# Patient Record
Sex: MALE | Race: WHITE
[De-identification: names, ages, dates, MRNs, and addresses within clinical notes are randomized per-mention and may not be internally consistent; named-entity substitution may affect disease eponyms.]

---

## 2020-04-27 ENCOUNTER — HOSPITAL ENCOUNTER (OUTPATIENT)
Dept: HOSPITAL 95 - LAB SHORT | Age: 60
Discharge: HOME | End: 2020-04-27
Attending: DERMATOLOGY
Payer: MEDICARE

## 2020-04-27 DIAGNOSIS — D48.5: Primary | ICD-10-CM

## 2021-11-30 ENCOUNTER — HOSPITAL ENCOUNTER (OUTPATIENT)
Dept: HOSPITAL 95 - ORSCSDS | Age: 61
Discharge: HOME | End: 2021-11-30
Attending: INTERNAL MEDICINE
Payer: MEDICARE

## 2021-11-30 VITALS — WEIGHT: 227.96 LBS | HEIGHT: 72 IN | BODY MASS INDEX: 30.88 KG/M2

## 2021-11-30 DIAGNOSIS — Z80.0: ICD-10-CM

## 2021-11-30 DIAGNOSIS — Z12.11: Primary | ICD-10-CM

## 2021-11-30 DIAGNOSIS — Z79.899: ICD-10-CM

## 2021-11-30 DIAGNOSIS — Z86.010: ICD-10-CM

## 2021-11-30 PROCEDURE — 0DJD8ZZ INSPECTION OF LOWER INTESTINAL TRACT, VIA NATURAL OR ARTIFICIAL OPENING ENDOSCOPIC: ICD-10-PCS | Performed by: INTERNAL MEDICINE

## 2022-10-20 ENCOUNTER — HOSPITAL ENCOUNTER (OUTPATIENT)
Dept: HOSPITAL 95 - ORSCSDS | Age: 62
Discharge: HOME | End: 2022-10-20
Attending: INTERNAL MEDICINE
Payer: MEDICARE

## 2022-10-20 VITALS — WEIGHT: 215.83 LBS | HEIGHT: 72 IN | BODY MASS INDEX: 29.23 KG/M2

## 2022-10-20 DIAGNOSIS — K51.40: ICD-10-CM

## 2022-10-20 DIAGNOSIS — F31.9: ICD-10-CM

## 2022-10-20 DIAGNOSIS — Z12.11: ICD-10-CM

## 2022-10-20 DIAGNOSIS — N18.4: ICD-10-CM

## 2022-10-20 DIAGNOSIS — Z86.010: Primary | ICD-10-CM

## 2022-10-20 DIAGNOSIS — D12.3: ICD-10-CM

## 2022-10-20 DIAGNOSIS — Z79.899: ICD-10-CM

## 2022-10-20 DIAGNOSIS — Z80.0: ICD-10-CM

## 2022-10-20 DIAGNOSIS — Z85.528: ICD-10-CM

## 2022-10-20 DIAGNOSIS — K64.8: ICD-10-CM

## 2022-10-20 DIAGNOSIS — E78.5: ICD-10-CM

## 2022-10-20 DIAGNOSIS — Z87.891: ICD-10-CM

## 2022-10-20 DIAGNOSIS — Z99.2: ICD-10-CM

## 2022-10-20 DIAGNOSIS — I12.9: ICD-10-CM

## 2022-10-20 PROCEDURE — 0DBM8ZX EXCISION OF DESCENDING COLON, VIA NATURAL OR ARTIFICIAL OPENING ENDOSCOPIC, DIAGNOSTIC: ICD-10-PCS | Performed by: INTERNAL MEDICINE

## 2022-10-20 PROCEDURE — 0DBL8ZX EXCISION OF TRANSVERSE COLON, VIA NATURAL OR ARTIFICIAL OPENING ENDOSCOPIC, DIAGNOSTIC: ICD-10-PCS | Performed by: INTERNAL MEDICINE

## 2024-11-19 ENCOUNTER — HOSPITAL ENCOUNTER (OUTPATIENT)
Dept: HOSPITAL 95 - ORSCSDS | Age: 64
Discharge: HOME | End: 2024-11-19
Attending: INTERNAL MEDICINE
Payer: COMMERCIAL

## 2024-11-19 VITALS — DIASTOLIC BLOOD PRESSURE: 73 MMHG | SYSTOLIC BLOOD PRESSURE: 151 MMHG

## 2024-11-19 VITALS — WEIGHT: 217.38 LBS | BODY MASS INDEX: 29.44 KG/M2 | HEIGHT: 72 IN

## 2024-11-19 DIAGNOSIS — N18.5: ICD-10-CM

## 2024-11-19 DIAGNOSIS — K59.00: ICD-10-CM

## 2024-11-19 DIAGNOSIS — K22.70: ICD-10-CM

## 2024-11-19 DIAGNOSIS — E78.5: ICD-10-CM

## 2024-11-19 DIAGNOSIS — R10.13: Primary | ICD-10-CM

## 2024-11-19 DIAGNOSIS — Z79.899: ICD-10-CM

## 2024-11-19 DIAGNOSIS — Z87.891: ICD-10-CM

## 2024-11-19 DIAGNOSIS — R63.4: ICD-10-CM

## 2024-11-19 DIAGNOSIS — I12.0: ICD-10-CM

## 2024-11-19 PROCEDURE — 0DB58ZX EXCISION OF ESOPHAGUS, VIA NATURAL OR ARTIFICIAL OPENING ENDOSCOPIC, DIAGNOSTIC: ICD-10-PCS | Performed by: INTERNAL MEDICINE

## 2025-02-21 ENCOUNTER — HOSPITAL ENCOUNTER (OUTPATIENT)
Dept: HOSPITAL 95 - ORSCSDS | Age: 65
Discharge: HOME | End: 2025-02-21
Attending: INTERNAL MEDICINE
Payer: COMMERCIAL

## 2025-02-21 VITALS — WEIGHT: 207.23 LBS | BODY MASS INDEX: 28.07 KG/M2 | HEIGHT: 72 IN

## 2025-02-21 VITALS — DIASTOLIC BLOOD PRESSURE: 61 MMHG | SYSTOLIC BLOOD PRESSURE: 108 MMHG

## 2025-02-21 DIAGNOSIS — Z85.528: ICD-10-CM

## 2025-02-21 DIAGNOSIS — R63.4: Primary | ICD-10-CM

## 2025-02-21 DIAGNOSIS — Z85.46: ICD-10-CM

## 2025-02-21 DIAGNOSIS — D12.0: ICD-10-CM

## 2025-02-21 DIAGNOSIS — Z86.0101: ICD-10-CM

## 2025-02-21 DIAGNOSIS — I12.0: ICD-10-CM

## 2025-02-21 DIAGNOSIS — Z80.0: ICD-10-CM

## 2025-02-21 DIAGNOSIS — Z87.891: ICD-10-CM

## 2025-02-21 DIAGNOSIS — N18.5: ICD-10-CM

## 2025-02-21 DIAGNOSIS — Z79.899: ICD-10-CM

## 2025-02-21 PROCEDURE — 0DBH8ZX EXCISION OF CECUM, VIA NATURAL OR ARTIFICIAL OPENING ENDOSCOPIC, DIAGNOSTIC: ICD-10-PCS | Performed by: INTERNAL MEDICINE

## 2025-02-21 NOTE — NUR
02/21/25 Aneta Valdez
PT REPORTED SLIGHT DIZZINESS FROM VERTIGO AND HE STATES "PROBABLY FROM
THE MEDICATION", VITALS WITHIN NORMAL RANGE, NO ABNORMAL SYMPTOMS
REPORTED BY PATIENT, PATIENT EXPRESSES READINESS TO LEAVE.

## 2025-03-16 ENCOUNTER — HOSPITAL ENCOUNTER (INPATIENT)
Dept: HOSPITAL 95 - ER | Age: 65
LOS: 9 days | Discharge: NURSING, REHAB OR LONG-TERM CARE | DRG: 480 | End: 2025-03-25
Attending: INTERNAL MEDICINE | Admitting: STUDENT IN AN ORGANIZED HEALTH CARE EDUCATION/TRAINING PROGRAM
Payer: COMMERCIAL

## 2025-03-16 VITALS — SYSTOLIC BLOOD PRESSURE: 160 MMHG | DIASTOLIC BLOOD PRESSURE: 81 MMHG

## 2025-03-16 VITALS — HEIGHT: 72 IN | BODY MASS INDEX: 27.95 KG/M2 | WEIGHT: 206.35 LBS

## 2025-03-16 DIAGNOSIS — T68.XXXA: ICD-10-CM

## 2025-03-16 DIAGNOSIS — Z90.49: ICD-10-CM

## 2025-03-16 DIAGNOSIS — Z99.2: ICD-10-CM

## 2025-03-16 DIAGNOSIS — D63.1: ICD-10-CM

## 2025-03-16 DIAGNOSIS — E87.6: ICD-10-CM

## 2025-03-16 DIAGNOSIS — N18.6: ICD-10-CM

## 2025-03-16 DIAGNOSIS — D50.9: ICD-10-CM

## 2025-03-16 DIAGNOSIS — K59.00: ICD-10-CM

## 2025-03-16 DIAGNOSIS — R50.9: ICD-10-CM

## 2025-03-16 DIAGNOSIS — F31.9: ICD-10-CM

## 2025-03-16 DIAGNOSIS — E78.5: ICD-10-CM

## 2025-03-16 DIAGNOSIS — M51.369: ICD-10-CM

## 2025-03-16 DIAGNOSIS — S72.492A: Primary | ICD-10-CM

## 2025-03-16 DIAGNOSIS — Z85.828: ICD-10-CM

## 2025-03-16 DIAGNOSIS — M25.462: ICD-10-CM

## 2025-03-16 DIAGNOSIS — M19.90: ICD-10-CM

## 2025-03-16 DIAGNOSIS — Z79.899: ICD-10-CM

## 2025-03-16 DIAGNOSIS — N40.0: ICD-10-CM

## 2025-03-16 DIAGNOSIS — G89.29: ICD-10-CM

## 2025-03-16 DIAGNOSIS — Z88.8: ICD-10-CM

## 2025-03-16 DIAGNOSIS — N25.81: ICD-10-CM

## 2025-03-16 DIAGNOSIS — G93.40: ICD-10-CM
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
## 2025-03-16 DIAGNOSIS — Z90.5: ICD-10-CM

## 2025-03-16 DIAGNOSIS — I12.0: ICD-10-CM

## 2025-03-16 LAB
ALBUMIN SERPL BCP-MCNC: 3.2 G/DL (ref 3.4–5)
ALBUMIN/GLOB SERPL: 1 {RATIO} (ref 0.8–1.8)
ALT SERPL W P-5'-P-CCNC: 37 U/L (ref 12–78)
ANION GAP SERPL CALCULATED.4IONS-SCNC: 12 MMOL/L (ref 3–11)
AST SERPL W P-5'-P-CCNC: 24 U/L (ref 12–37)
BASOPHILS # BLD AUTO: 0 K/MM3 (ref 0–0.23)
BASOPHILS NFR BLD AUTO: 0 % (ref 0–2)
BILIRUB SERPL-MCNC: 0.4 MG/DL (ref 0.1–1)
BUN SERPL-MCNC: 26 MG/DL (ref 8–24)
CALCIUM SERPL-MCNC: 8.3 MG/DL (ref 8.5–10.1)
CHLORIDE SERPL-SCNC: 112 MMOL/L (ref 98–108)
CO2 SERPL-SCNC: 21 MMOL/L (ref 21–32)
CREAT SERPL-MCNC: 3.39 MG/DL (ref 0.6–1.2)
DEPRECATED RDW RBC AUTO: 46.4 FL (ref 35.1–46.3)
EOSINOPHIL # BLD AUTO: 0.37 K/MM3 (ref 0–0.68)
EOSINOPHIL NFR BLD AUTO: 5 % (ref 0–6)
ERYTHROCYTE [DISTWIDTH] IN BLOOD BY AUTOMATED COUNT: 13.4 % (ref 11.7–14.2)
GLOBULIN SER CALC-MCNC: 3.3 G/DL (ref 2.2–4)
GLUCOSE SERPL-MCNC: 100 MG/DL (ref 70–99)
HCT VFR BLD AUTO: 36.8 % (ref 37–53)
HGB BLD-MCNC: 12.2 G/DL (ref 13.5–17.5)
IMM GRANULOCYTES # BLD AUTO: 0.02 K/MM3 (ref 0–0.1)
IMM GRANULOCYTES NFR BLD AUTO: 0 % (ref 0–1)
LYMPHOCYTES # BLD AUTO: 1.84 K/MM3 (ref 0.84–5.2)
LYMPHOCYTES NFR BLD AUTO: 27 % (ref 21–46)
MCHC RBC AUTO-ENTMCNC: 33.2 G/DL (ref 31.5–36.5)
MCV RBC AUTO: 96 FL (ref 80–100)
MONOCYTES # BLD AUTO: 0.55 K/MM3 (ref 0.16–1.47)
MONOCYTES NFR BLD AUTO: 8 % (ref 4–13)
NEUTROPHILS # BLD AUTO: 4.03 K/MM3 (ref 1.96–9.15)
NEUTROPHILS NFR BLD AUTO: 59 % (ref 41–73)
NRBC # BLD AUTO: 0 K/MM3 (ref 0–0.02)
NRBC BLD AUTO-RTO: 0 /100 WBC (ref 0–0.2)
PLATELET # BLD AUTO: 162 K/MM3 (ref 150–400)
POTASSIUM SERPL-SCNC: 4.3 MMOL/L (ref 3.5–5.5)
PROT SERPL-MCNC: 6.5 G/DL (ref 6.4–8.2)
PROTHROMBIN TIME: 10.8 SEC (ref 9.7–11.5)
SODIUM SERPL-SCNC: 141 MMOL/L (ref 136–145)

## 2025-03-16 PROCEDURE — A9270 NON-COVERED ITEM OR SERVICE: HCPCS

## 2025-03-16 PROCEDURE — C1894 INTRO/SHEATH, NON-LASER: HCPCS

## 2025-03-16 PROCEDURE — C1713 ANCHOR/SCREW BN/BN,TIS/BN: HCPCS

## 2025-03-16 PROCEDURE — C1769 GUIDE WIRE: HCPCS

## 2025-03-16 PROCEDURE — C1750 CATH, HEMODIALYSIS,LONG-TERM: HCPCS

## 2025-03-17 VITALS — SYSTOLIC BLOOD PRESSURE: 168 MMHG | DIASTOLIC BLOOD PRESSURE: 80 MMHG

## 2025-03-17 VITALS — SYSTOLIC BLOOD PRESSURE: 111 MMHG | DIASTOLIC BLOOD PRESSURE: 58 MMHG

## 2025-03-17 VITALS — SYSTOLIC BLOOD PRESSURE: 128 MMHG | DIASTOLIC BLOOD PRESSURE: 63 MMHG

## 2025-03-17 VITALS — DIASTOLIC BLOOD PRESSURE: 62 MMHG | SYSTOLIC BLOOD PRESSURE: 114 MMHG

## 2025-03-17 VITALS — SYSTOLIC BLOOD PRESSURE: 164 MMHG | DIASTOLIC BLOOD PRESSURE: 83 MMHG

## 2025-03-17 VITALS — SYSTOLIC BLOOD PRESSURE: 164 MMHG | DIASTOLIC BLOOD PRESSURE: 77 MMHG

## 2025-03-17 VITALS — SYSTOLIC BLOOD PRESSURE: 111 MMHG | DIASTOLIC BLOOD PRESSURE: 51 MMHG

## 2025-03-17 VITALS — DIASTOLIC BLOOD PRESSURE: 49 MMHG | SYSTOLIC BLOOD PRESSURE: 111 MMHG

## 2025-03-17 VITALS — SYSTOLIC BLOOD PRESSURE: 173 MMHG | DIASTOLIC BLOOD PRESSURE: 82 MMHG

## 2025-03-17 VITALS — DIASTOLIC BLOOD PRESSURE: 73 MMHG | SYSTOLIC BLOOD PRESSURE: 144 MMHG

## 2025-03-17 VITALS — DIASTOLIC BLOOD PRESSURE: 76 MMHG | SYSTOLIC BLOOD PRESSURE: 140 MMHG

## 2025-03-17 VITALS — SYSTOLIC BLOOD PRESSURE: 148 MMHG | DIASTOLIC BLOOD PRESSURE: 73 MMHG

## 2025-03-17 VITALS — DIASTOLIC BLOOD PRESSURE: 63 MMHG | SYSTOLIC BLOOD PRESSURE: 127 MMHG

## 2025-03-17 VITALS — SYSTOLIC BLOOD PRESSURE: 150 MMHG | DIASTOLIC BLOOD PRESSURE: 69 MMHG

## 2025-03-17 VITALS — SYSTOLIC BLOOD PRESSURE: 108 MMHG | DIASTOLIC BLOOD PRESSURE: 49 MMHG

## 2025-03-17 VITALS — DIASTOLIC BLOOD PRESSURE: 86 MMHG | SYSTOLIC BLOOD PRESSURE: 173 MMHG

## 2025-03-17 VITALS — SYSTOLIC BLOOD PRESSURE: 115 MMHG | DIASTOLIC BLOOD PRESSURE: 63 MMHG

## 2025-03-17 VITALS — DIASTOLIC BLOOD PRESSURE: 66 MMHG | SYSTOLIC BLOOD PRESSURE: 113 MMHG

## 2025-03-17 VITALS — SYSTOLIC BLOOD PRESSURE: 135 MMHG | DIASTOLIC BLOOD PRESSURE: 83 MMHG

## 2025-03-17 VITALS — SYSTOLIC BLOOD PRESSURE: 139 MMHG | DIASTOLIC BLOOD PRESSURE: 86 MMHG

## 2025-03-17 VITALS — SYSTOLIC BLOOD PRESSURE: 133 MMHG | DIASTOLIC BLOOD PRESSURE: 63 MMHG

## 2025-03-17 VITALS — DIASTOLIC BLOOD PRESSURE: 81 MMHG | SYSTOLIC BLOOD PRESSURE: 134 MMHG

## 2025-03-17 LAB
ANION GAP SERPL CALCULATED.4IONS-SCNC: 11 MMOL/L (ref 3–11)
BUN SERPL-MCNC: 29 MG/DL (ref 8–24)
CALCIUM SERPL-MCNC: 8.6 MG/DL (ref 8.5–10.1)
CHLORIDE SERPL-SCNC: 112 MMOL/L (ref 98–108)
CO2 SERPL-SCNC: 21 MMOL/L (ref 21–32)
CREAT SERPL-MCNC: 3.52 MG/DL (ref 0.6–1.2)
DEPRECATED RDW RBC AUTO: 46.6 FL (ref 35.1–46.3)
ERYTHROCYTE [DISTWIDTH] IN BLOOD BY AUTOMATED COUNT: 13.6 % (ref 11.7–14.2)
GLUCOSE SERPL-MCNC: 119 MG/DL (ref 70–99)
HCT VFR BLD AUTO: 31.2 % (ref 37–53)
HGB BLD-MCNC: 10.6 G/DL (ref 13.5–17.5)
MCHC RBC AUTO-ENTMCNC: 34 G/DL (ref 31.5–36.5)
MCV RBC AUTO: 95 FL (ref 80–100)
NRBC # BLD AUTO: 0 K/MM3 (ref 0–0.02)
NRBC BLD AUTO-RTO: 0 /100 WBC (ref 0–0.2)
PLATELET # BLD AUTO: 207 K/MM3 (ref 150–400)
POTASSIUM SERPL-SCNC: 4.5 MMOL/L (ref 3.5–5.5)
PROTHROMBIN TIME: 10.7 SEC (ref 9.7–11.5)
SODIUM SERPL-SCNC: 139 MMOL/L (ref 136–145)

## 2025-03-17 NOTE — NUR
TRANSFER TO UNIT
AFTER RECEIVING REPORT FROM PACU RN, PATIENT TRANSFERRED TO UNIT VIA BED AT
APPROX 1745. PATIENT LETHARGIC - EASILY AROUSABLE WITH VERBAL STIMULI BEFORE
FALLING BACK TO SLEEP. DENIES PAIN. S/P L ORIF. LLE IN ACEWRAP AND BRACE -
PPP. BRUISING NOTED TO TOP OF L FOOT. ON 2L VIA NC, SATs >90%. RR SHALLOW. SBP
150s. MAP >65. SCHEDULED PO HYDRALAZINE ORDERED FOR 2100. DENIES CHEST PAIN,
PRESSURE.  MALE PW IN PLACE TO SUCTION. IVF AND TXA INFUSING TO GRAVITY. CALL
LIGHT IN REACH.

## 2025-03-18 VITALS — SYSTOLIC BLOOD PRESSURE: 126 MMHG | DIASTOLIC BLOOD PRESSURE: 73 MMHG

## 2025-03-18 VITALS — SYSTOLIC BLOOD PRESSURE: 135 MMHG | DIASTOLIC BLOOD PRESSURE: 63 MMHG

## 2025-03-18 VITALS — SYSTOLIC BLOOD PRESSURE: 155 MMHG | DIASTOLIC BLOOD PRESSURE: 72 MMHG

## 2025-03-18 VITALS — DIASTOLIC BLOOD PRESSURE: 69 MMHG | SYSTOLIC BLOOD PRESSURE: 144 MMHG

## 2025-03-18 VITALS — DIASTOLIC BLOOD PRESSURE: 72 MMHG | SYSTOLIC BLOOD PRESSURE: 155 MMHG

## 2025-03-18 PROCEDURE — 0QSC04Z REPOSITION LEFT LOWER FEMUR WITH INTERNAL FIXATION DEVICE, OPEN APPROACH: ICD-10-PCS

## 2025-03-18 NOTE — NUR
SHIFT SUMMARY
POD 1 L FEMUR ORIF. NO ACUTE CHANGES OVERNIGHT. VSS. TOLERATING ORALS. VOIDING
USING MALE PUREWICK. HEMODIALYSIS THROUGHOUT THE NIGHT. LLE c ACE WRAP/BRACE
IN USE. PT REPORTS PAIN TOLERABLE, MEDICATED PER EMAR. MAINTAINED BEDREST
STATUS. PT SLEPT WELL. CALL LIGHT IN REACH, BED IN LOWEST POSITION, WILL
REPORT TO DAY RN.

## 2025-03-18 NOTE — NUR
DISCHARGE
 
PT REQUESTED TO DISCHARGE THIS EVENING. FARZANA BROWN RN WENT OVER
DISCHARGE INSTRUCTIONS WITH PT. MEDF REC COMPLETED, AND DISCHARGE PAPERWORK
SIGNED. IV REMOVED. WAITING TO BE WHEELED BY CNA AT THIS TIME.

## 2025-03-18 NOTE — NUR
end of shift
Pt presently up to chair with 2-3 max assist. Previously sat up on bsc to pass
flatus without successful bm. Wearing Knee brace L leg. LLE remains ace
wrapped. Presently resting quietly. denies complaints.

## 2025-03-19 VITALS — SYSTOLIC BLOOD PRESSURE: 127 MMHG | DIASTOLIC BLOOD PRESSURE: 62 MMHG

## 2025-03-19 VITALS — SYSTOLIC BLOOD PRESSURE: 137 MMHG | DIASTOLIC BLOOD PRESSURE: 64 MMHG

## 2025-03-19 VITALS — DIASTOLIC BLOOD PRESSURE: 73 MMHG | SYSTOLIC BLOOD PRESSURE: 147 MMHG

## 2025-03-19 VITALS — DIASTOLIC BLOOD PRESSURE: 65 MMHG | SYSTOLIC BLOOD PRESSURE: 144 MMHG

## 2025-03-19 LAB
ALBUMIN SERPL BCP-MCNC: 2.7 G/DL (ref 3.4–5)
ANION GAP SERPL CALCULATED.4IONS-SCNC: 9 MMOL/L (ref 3–11)
BASOPHILS # BLD AUTO: 0.01 K/MM3 (ref 0–0.23)
BASOPHILS NFR BLD AUTO: 0 % (ref 0–2)
BUN SERPL-MCNC: 40 MG/DL (ref 8–24)
CALCIUM SERPL-MCNC: 8.1 MG/DL (ref 8.5–10.1)
CHLORIDE SERPL-SCNC: 108 MMOL/L (ref 98–108)
CO2 SERPL-SCNC: 25 MMOL/L (ref 21–32)
CREAT SERPL-MCNC: 4.42 MG/DL (ref 0.6–1.2)
DEPRECATED RDW RBC AUTO: 46.5 FL (ref 35.1–46.3)
EOSINOPHIL # BLD AUTO: 0.05 K/MM3 (ref 0–0.68)
EOSINOPHIL NFR BLD AUTO: 1 % (ref 0–6)
ERYTHROCYTE [DISTWIDTH] IN BLOOD BY AUTOMATED COUNT: 13.5 % (ref 11.7–14.2)
GLUCOSE SERPL-MCNC: 106 MG/DL (ref 70–99)
HBV SURFACE AB SERPL IA-ACNC: 31.54 IU/L
HCT VFR BLD AUTO: 23.4 % (ref 37–53)
HGB BLD-MCNC: 7.9 G/DL (ref 13.5–17.5)
IMM GRANULOCYTES # BLD AUTO: 0.02 K/MM3 (ref 0–0.1)
IMM GRANULOCYTES NFR BLD AUTO: 0 % (ref 0–1)
LYMPHOCYTES # BLD AUTO: 2.44 K/MM3 (ref 0.84–5.2)
LYMPHOCYTES NFR BLD AUTO: 26 % (ref 21–46)
MCHC RBC AUTO-ENTMCNC: 33.8 G/DL (ref 31.5–36.5)
MCV RBC AUTO: 94 FL (ref 80–100)
MONOCYTES # BLD AUTO: 1 K/MM3 (ref 0.16–1.47)
MONOCYTES NFR BLD AUTO: 11 % (ref 4–13)
NEUTROPHILS # BLD AUTO: 5.83 K/MM3 (ref 1.96–9.15)
NEUTROPHILS NFR BLD AUTO: 62 % (ref 41–73)
NRBC # BLD AUTO: 0 K/MM3 (ref 0–0.02)
NRBC BLD AUTO-RTO: 0 /100 WBC (ref 0–0.2)
PHOSPHATE SERPL-MCNC: 3.8 MG/DL (ref 2.5–4.9)
PLATELET # BLD AUTO: 155 K/MM3 (ref 150–400)
POTASSIUM SERPL-SCNC: 3.9 MMOL/L (ref 3.5–5.5)
SODIUM SERPL-SCNC: 138 MMOL/L (ref 136–145)
TIBC SERPL-MCNC: 179 UG/DL (ref 250–450)

## 2025-03-19 NOTE — NUR
PT EYES CLOSED AND QUIET UPON ENTERING ROOM.  PT WAKENS EASILY TO FULL ORIENT.
OVERNIGHT PD THERAPY COMPLETE AS ORDERED.  PT AESEPTICALLY DISCONNECTED AND
CAPPED.  CYCLER STRIPPED AND CLEANED.

## 2025-03-19 NOTE — NUR
SHIFT SUMMARY
 
PT HAS RESTED T/O THE SHIFT. PT S/P LEFT ORIF, SURGICAL SITE WNL. PAIN BEING
MANAGED PER MAR. MALE PUREWICK IN PLACE, PT VOIDING. PERITONEAL DIALYSIS IN
PLACE T/O SHIFT, BEING MANAGED BY DIALYSIS RN. VITALS STABLE. PLAN OF CARE
UNCHANGED. BED IN LOWEST POSITION, CALL LIGHT WITHIN REACH.

## 2025-03-19 NOTE — NUR
PT AWAKE / ALERT
LAYING IN BED WATCHING TV.  PT VERBALIZED RELATIVE COMFORT AT THIS
TIME.
CYCLER STRUNG, PRIMED AND PROGRAMMED PER RX.  WHEN PRIME COMPLETE, PT
AESEPTICALLY CONNECTED.  PT MONITORED THROUGH FILL #1 AND NO DISCOMFORT
REPORTED.
EXIT SITE CARE DONE. NEW STERILE DRESSING APPLIED WITH ONE STRAIN RELIEF.

## 2025-03-19 NOTE — NUR
SUMMARY
AOX4, IMMOBILIZER WITH ACE WRAP TO RLE, SCDS IN PLACE. PATIENT WORKED WITH
THERAPY TO EOB, DENIES N/V. LITTLE INTAKE D/T DECREASED APPETITE. MEDICATED
FOR PAIN. PD DIALYSIS IS RUNNING WITH CATHERINE ECHAVARRIA THIS EVENING. VERY LOW URINE
OUTPUT. NO ACUTE EVENTS. CALL LIGHT IN REACH.

## 2025-03-20 VITALS — DIASTOLIC BLOOD PRESSURE: 58 MMHG | SYSTOLIC BLOOD PRESSURE: 133 MMHG

## 2025-03-20 VITALS — SYSTOLIC BLOOD PRESSURE: 156 MMHG | DIASTOLIC BLOOD PRESSURE: 67 MMHG

## 2025-03-20 VITALS — DIASTOLIC BLOOD PRESSURE: 59 MMHG | SYSTOLIC BLOOD PRESSURE: 123 MMHG

## 2025-03-20 VITALS — DIASTOLIC BLOOD PRESSURE: 67 MMHG | SYSTOLIC BLOOD PRESSURE: 135 MMHG

## 2025-03-20 VITALS — SYSTOLIC BLOOD PRESSURE: 125 MMHG | DIASTOLIC BLOOD PRESSURE: 62 MMHG

## 2025-03-20 VITALS — SYSTOLIC BLOOD PRESSURE: 157 MMHG | DIASTOLIC BLOOD PRESSURE: 59 MMHG

## 2025-03-20 LAB
ALBUMIN SERPL BCP-MCNC: 2.4 G/DL (ref 3.4–5)
ANION GAP SERPL CALCULATED.4IONS-SCNC: 10 MMOL/L (ref 3–11)
BUN SERPL-MCNC: 42 MG/DL (ref 8–24)
CALCIUM SERPL-MCNC: 8.3 MG/DL (ref 8.5–10.1)
CHLORIDE SERPL-SCNC: 107 MMOL/L (ref 98–108)
CO2 SERPL-SCNC: 25 MMOL/L (ref 21–32)
CREAT SERPL-MCNC: 4.59 MG/DL (ref 0.6–1.2)
GLUCOSE SERPL-MCNC: 112 MG/DL (ref 70–99)
PHOSPHATE SERPL-MCNC: 3.4 MG/DL (ref 2.5–4.9)
POTASSIUM SERPL-SCNC: 3.5 MMOL/L (ref 3.5–5.5)
SODIUM SERPL-SCNC: 138 MMOL/L (ref 136–145)

## 2025-03-20 PROCEDURE — 02HV33Z INSERTION OF INFUSION DEVICE INTO SUPERIOR VENA CAVA, PERCUTANEOUS APPROACH: ICD-10-PCS | Performed by: INTERNAL MEDICINE

## 2025-03-20 PROCEDURE — 5A1D70Z PERFORMANCE OF URINARY FILTRATION, INTERMITTENT, LESS THAN 6 HOURS PER DAY: ICD-10-PCS | Performed by: INTERNAL MEDICINE

## 2025-03-20 PROCEDURE — B5181ZA FLUOROSCOPY OF SUPERIOR VENA CAVA USING LOW OSMOLAR CONTRAST, GUIDANCE: ICD-10-PCS | Performed by: INTERNAL MEDICINE

## 2025-03-20 PROCEDURE — 0JH60XZ INSERTION OF TUNNELED VASCULAR ACCESS DEVICE INTO CHEST SUBCUTANEOUS TISSUE AND FASCIA, OPEN APPROACH: ICD-10-PCS | Performed by: INTERNAL MEDICINE

## 2025-03-20 PROCEDURE — B548ZZA ULTRASONOGRAPHY OF SUPERIOR VENA CAVA, GUIDANCE: ICD-10-PCS | Performed by: INTERNAL MEDICINE

## 2025-03-20 NOTE — NUR
SHIFT SUMMARY
 
NO ACUTE CHANGES TODAY. VSS. ACE WRAP + IMMOBILIZER TO LLE ARE CDI. PT WORKED
WITH THERAPY TODAY AND WAS UP TO THE SIDE OF THE BED DOING EXERCISES. THERAPY
VERBALIZED IT WAS AN IMPROVED SESSION FROM YESTERDAY. 2 ROXICODONE FOR PAIN
CONTROL. PT RAMONA REG DIET, BUT NOW NPO FOR PERMACATH PLACEMENT POSSIBLY THIS
EVENING. PT USES CALL LIGHT APPROPRIATELY.

## 2025-03-20 NOTE — NUR
SHIFT SUMMARY
S/P LEFT KNEE ORIF. IMMOBILIZER AND ACE WRAP IN PLACE TO LLE/KNEE. PAIN
MANAGED PER EMAR. IS VOIDING, MALE PUREWICK IN PLACE. RAMONA PO INTAKE. IV
PATENT/SL. TMAX 100.1 AT 2100 VITALS; PT EDUCATED ON AND FREQUENTLY
ENCOURAGED DEEP BREATHING EXERCISES. PT DEMONSTRATED AND VERBALIZED
UNDERSTANDING. EXTRA BLANKETS REMOVED AND THERMOSTAT DECREASED DUE TO BEING ON
MAX HIGH. TEMP POST INTERVENTIONS REMAINED UNDER 100. PT REFUSED SCDS.  PD
DIALYSIS PORT CDI. PLAN TO WORK WITH THERAPY TODAY. WILL GIVE REPORT TO
ONCOMING RN.

## 2025-03-20 NOTE — NUR
PT AWAKE / ALERT / ORIENTED, LAYING IN BED POST RIGHT CHEST PERM CATH INSERT
EARLIER BY DR QUINONES.   CVC SITE CLEAR / INTACT / STABLE.
CYCLER STRUNG, PRIMED AND PROGRAMMED PER RX.
WHEN PRIME COMPLETE, PT AESEPTICALLY CONNECTED AND OVERNIGHT CCPD STARTED.
EXIT SITE CARE PERFORMED.  CLEANED WITH EXSEPT AND NEW STERILE DRESSING
APPLIED WITH ONE STRAIN RELIEF.
SURGICAL FLOOR STAFF AWARE OF OVERNIGHT CCPD IN PROGRESS.

## 2025-03-21 VITALS — SYSTOLIC BLOOD PRESSURE: 120 MMHG | DIASTOLIC BLOOD PRESSURE: 68 MMHG

## 2025-03-21 VITALS — SYSTOLIC BLOOD PRESSURE: 134 MMHG | DIASTOLIC BLOOD PRESSURE: 72 MMHG

## 2025-03-21 VITALS — DIASTOLIC BLOOD PRESSURE: 69 MMHG | SYSTOLIC BLOOD PRESSURE: 121 MMHG

## 2025-03-21 VITALS — DIASTOLIC BLOOD PRESSURE: 58 MMHG | SYSTOLIC BLOOD PRESSURE: 98 MMHG

## 2025-03-21 VITALS — SYSTOLIC BLOOD PRESSURE: 121 MMHG | DIASTOLIC BLOOD PRESSURE: 37 MMHG

## 2025-03-21 VITALS — DIASTOLIC BLOOD PRESSURE: 66 MMHG | SYSTOLIC BLOOD PRESSURE: 121 MMHG

## 2025-03-21 VITALS — SYSTOLIC BLOOD PRESSURE: 152 MMHG | DIASTOLIC BLOOD PRESSURE: 69 MMHG

## 2025-03-21 VITALS — DIASTOLIC BLOOD PRESSURE: 72 MMHG | SYSTOLIC BLOOD PRESSURE: 120 MMHG

## 2025-03-21 VITALS — DIASTOLIC BLOOD PRESSURE: 59 MMHG | SYSTOLIC BLOOD PRESSURE: 143 MMHG

## 2025-03-21 VITALS — SYSTOLIC BLOOD PRESSURE: 147 MMHG | DIASTOLIC BLOOD PRESSURE: 64 MMHG

## 2025-03-21 VITALS — SYSTOLIC BLOOD PRESSURE: 123 MMHG | DIASTOLIC BLOOD PRESSURE: 73 MMHG

## 2025-03-21 VITALS — DIASTOLIC BLOOD PRESSURE: 67 MMHG | SYSTOLIC BLOOD PRESSURE: 142 MMHG

## 2025-03-21 VITALS — DIASTOLIC BLOOD PRESSURE: 69 MMHG | SYSTOLIC BLOOD PRESSURE: 141 MMHG

## 2025-03-21 VITALS — DIASTOLIC BLOOD PRESSURE: 68 MMHG | SYSTOLIC BLOOD PRESSURE: 112 MMHG

## 2025-03-21 VITALS — SYSTOLIC BLOOD PRESSURE: 134 MMHG | DIASTOLIC BLOOD PRESSURE: 64 MMHG

## 2025-03-21 VITALS — SYSTOLIC BLOOD PRESSURE: 126 MMHG | DIASTOLIC BLOOD PRESSURE: 68 MMHG

## 2025-03-21 LAB
ALBUMIN SERPL BCP-MCNC: 2.5 G/DL (ref 3.4–5)
ALBUMIN/GLOB SERPL: 0.7 {RATIO} (ref 0.8–1.8)
ALT SERPL W P-5'-P-CCNC: 23 U/L (ref 12–78)
ANION GAP SERPL CALCULATED.4IONS-SCNC: 10 MMOL/L (ref 3–11)
AST SERPL W P-5'-P-CCNC: 40 U/L (ref 12–37)
BASOPHILS # BLD AUTO: 0 K/MM3 (ref 0–0.23)
BASOPHILS NFR BLD AUTO: 0 % (ref 0–2)
BILIRUB SERPL-MCNC: 0.5 MG/DL (ref 0.1–1)
BUN SERPL-MCNC: 44 MG/DL (ref 8–24)
CALCIUM SERPL-MCNC: 8.5 MG/DL (ref 8.5–10.1)
CHLORIDE SERPL-SCNC: 107 MMOL/L (ref 98–108)
CO2 SERPL-SCNC: 26 MMOL/L (ref 21–32)
CREAT SERPL-MCNC: 4.4 MG/DL (ref 0.6–1.2)
DEPRECATED RDW RBC AUTO: 48 FL (ref 35.1–46.3)
EOSINOPHIL # BLD AUTO: 0.21 K/MM3 (ref 0–0.68)
EOSINOPHIL NFR BLD AUTO: 3 % (ref 0–6)
ERYTHROCYTE [DISTWIDTH] IN BLOOD BY AUTOMATED COUNT: 13.7 % (ref 11.7–14.2)
GLOBULIN SER CALC-MCNC: 3.5 G/DL (ref 2.2–4)
GLUCOSE SERPL-MCNC: 112 MG/DL (ref 70–99)
HCT VFR BLD AUTO: 22.5 % (ref 37–53)
HGB BLD-MCNC: 7.3 G/DL (ref 13.5–17.5)
IMM GRANULOCYTES # BLD AUTO: 0.02 K/MM3 (ref 0–0.1)
IMM GRANULOCYTES NFR BLD AUTO: 0 % (ref 0–1)
LYMPHOCYTES # BLD AUTO: 2.03 K/MM3 (ref 0.84–5.2)
LYMPHOCYTES NFR BLD AUTO: 29 % (ref 21–46)
MCHC RBC AUTO-ENTMCNC: 32.4 G/DL (ref 31.5–36.5)
MCV RBC AUTO: 97 FL (ref 80–100)
MONOCYTES # BLD AUTO: 0.72 K/MM3 (ref 0.16–1.47)
MONOCYTES NFR BLD AUTO: 10 % (ref 4–13)
NEUTROPHILS # BLD AUTO: 4.04 K/MM3 (ref 1.96–9.15)
NEUTROPHILS NFR BLD AUTO: 58 % (ref 41–73)
NRBC # BLD AUTO: 0 K/MM3 (ref 0–0.02)
NRBC BLD AUTO-RTO: 0 /100 WBC (ref 0–0.2)
PHOSPHATE SERPL-MCNC: 3.1 MG/DL (ref 2.5–4.9)
PLATELET # BLD AUTO: 176 K/MM3 (ref 150–400)
POTASSIUM SERPL-SCNC: 3.5 MMOL/L (ref 3.5–5.5)
PROT SERPL-MCNC: 6 G/DL (ref 6.4–8.2)
PROT UR STRIP-MCNC: (no result) MG/DL
RBC #/AREA URNS HPF: (no result) /HPF (ref 0–2)
SODIUM SERPL-SCNC: 139 MMOL/L (ref 136–145)
SP GR SPEC: 1.01 (ref 1–1.02)
UROBILINOGEN UR STRIP-MCNC: (no result) MG/DL
WBC #/AREA URNS HPF: (no result) /HPF (ref 0–5)

## 2025-03-21 NOTE — NUR
DIALYSIS:
PT TO DIALYSIS AT THIS TIME. WIFE AT BEDSIDE. PT GIVEN MORNING MEDS.
HYDRALAZINE HELD PER DIALYSIS RN REQUEST. MEDICATED FOR PAIN.

## 2025-03-21 NOTE — NUR
SHIFT SUMMARY/UPDATE
ASSUMED CARE OF PT FROM DARLINE ALEXANDER AT 1330. PT RESTING IN BED W/ FAMILY AT
BEDSIDE. WORKED W/ OT, PT FEBRILE .2 AND TACHY  PAINFUL AFTER
THERAPY.  MEDICATED W/ PERCOCET PER EMAR. ORAL TEMP RECHECK 98.8, BUT PT THEN
BECAME INCREASINGLY CONFUSED, ALERT AND RESPONSIVE BUT ONLY ORIENTED TO
HIMSELF. DR. MILLS NOTIFIED OF PT'S FEVER, TACHYCARDIA, AND INCREASED
CONFUSION. ORDERS FOR CULTURES RECIEVED AND URINALYSIS. AWAITING PT'S NEXT
VOID. SKIN CHECKED FOR NEW INJURIES/REDNESS PER DR. MILLS, NO NEW FINDINGS
NOTED. DR. MILLS TO ASSESS PT AT BEDSIDE THIS EVENING. DRESSING TO L LEG
C/D/I, INCISION SITE W/O REDNESS/SWELLING. PT ABLE TO WIGGLE L TOES ON
COMMAND, PEDAL PULSE PALPABLE. AREA AROUND PERMACATH ALSO C/D/I W/O
REDNESS/INFLAMMATION. PT RESTING IN BED W/ CNA AT BEDSIDE, CALL LIGHT IN
REACH, BED ALARM ON.

## 2025-03-21 NOTE — NUR
PER CNA PATIENT WAS A LITTLE CONFUSED.  WHEN I ASSESSED PT HE WAS ALERT AND
ORIENTED X3. LT LEG IN SOFT BRACE, ACEWRAP COVERING DRESSING, NO DRAINAGE
NOTED.  PATIENT ANSWERS QUESTIONS APPROP. USES CALL LIGHT APPROP. PERITONEAL
DIALYSIS AT THIS TIME CATH RIGHT ABD. PT HAD A PERMACATH PLACED RIGH UPPER
CHEST. SCHEDULED MEDS GIVEN AS ORDERED.  PATIENT TAKES MEDS WITH NO PROBLEMS.
THIS AM WHEN LAB CAME TO DRAW BLOOD THE SAID THAT THERE WAS WATER UNDER THE
BED AND ALL OVER THE FLOOR. DIALYSIS BAG ON THE BOTTOM RACK WAS DRIPPING
STEADLY FROM CAP. TRIED TO TIGHTEN CAP BUT IT CONT.TO LEAK. CLAMP DIRECTLY
ABOVE CAP WAS OPEN. CLAMPED SHUT AT THIS TIME AND NOW NOT LEAKING.  FLUID WAS
GOING UNDER BED T/O NOC AND IT WASNT UNTIL IT CAME OUT FROM UNDER THE BED THEN
IT WAS NOTICED. HOUSE KEEPING CALLED.  NO ACUTE CHANGES T/O NOC. REPORT TO RN
COMING ON TAKING THIS PATIENT. SL LEFT HAND WAS LEAKING, DC'D. RT HAND SL
FLUSHED AND PATENT.

## 2025-03-21 NOTE — NUR
DIALYSIS NURSE CALLED TO COME AND CULTURE PTS HEMODIALYSIS CATHETER. BLOOD
CULTURES DRAWN ASEPTICALLY FROM RIGHT PERMACATH AND SENT TO LAB AT APPROX
1915PM.

## 2025-03-22 VITALS — SYSTOLIC BLOOD PRESSURE: 128 MMHG | DIASTOLIC BLOOD PRESSURE: 64 MMHG

## 2025-03-22 VITALS — SYSTOLIC BLOOD PRESSURE: 136 MMHG | DIASTOLIC BLOOD PRESSURE: 63 MMHG

## 2025-03-22 VITALS — SYSTOLIC BLOOD PRESSURE: 128 MMHG | DIASTOLIC BLOOD PRESSURE: 51 MMHG

## 2025-03-22 VITALS — SYSTOLIC BLOOD PRESSURE: 124 MMHG | DIASTOLIC BLOOD PRESSURE: 66 MMHG

## 2025-03-22 VITALS — DIASTOLIC BLOOD PRESSURE: 66 MMHG | SYSTOLIC BLOOD PRESSURE: 153 MMHG

## 2025-03-22 LAB
ALBUMIN SERPL BCP-MCNC: 2.3 G/DL (ref 3.4–5)
ALBUMIN SERPL BCP-MCNC: 2.5 G/DL (ref 3.4–5)
ALBUMIN/GLOB SERPL: 0.6 {RATIO} (ref 0.8–1.8)
ALT SERPL W P-5'-P-CCNC: 41 U/L (ref 12–78)
ANION GAP SERPL CALCULATED.4IONS-SCNC: 8 MMOL/L (ref 3–11)
ANION GAP SERPL CALCULATED.4IONS-SCNC: 9 MMOL/L (ref 3–11)
AST SERPL W P-5'-P-CCNC: 59 U/L (ref 12–37)
BILIRUB SERPL-MCNC: 0.8 MG/DL (ref 0.1–1)
BUN SERPL-MCNC: 32 MG/DL (ref 8–24)
BUN SERPL-MCNC: 37 MG/DL (ref 8–24)
CALCIUM SERPL-MCNC: 8.7 MG/DL (ref 8.5–10.1)
CALCIUM SERPL-MCNC: 8.8 MG/DL (ref 8.5–10.1)
CHLORIDE SERPL-SCNC: 104 MMOL/L (ref 98–108)
CHLORIDE SERPL-SCNC: 104 MMOL/L (ref 98–108)
CO2 SERPL-SCNC: 27 MMOL/L (ref 21–32)
CO2 SERPL-SCNC: 28 MMOL/L (ref 21–32)
CREAT SERPL-MCNC: 3.41 MG/DL (ref 0.6–1.2)
CREAT SERPL-MCNC: 3.84 MG/DL (ref 0.6–1.2)
GLOBULIN SER CALC-MCNC: 4 G/DL (ref 2.2–4)
GLUCOSE SERPL-MCNC: 100 MG/DL (ref 70–99)
GLUCOSE SERPL-MCNC: 119 MG/DL (ref 70–99)
MAGNESIUM SERPL-MCNC: 2 MG/DL (ref 1.6–2.4)
PHOSPHATE SERPL-MCNC: 2.6 MG/DL (ref 2.5–4.9)
POTASSIUM SERPL-SCNC: 3.4 MMOL/L (ref 3.5–5.5)
POTASSIUM SERPL-SCNC: 3.4 MMOL/L (ref 3.5–5.5)
PROT SERPL-MCNC: 6.3 G/DL (ref 6.4–8.2)
SODIUM SERPL-SCNC: 137 MMOL/L (ref 136–145)
SODIUM SERPL-SCNC: 137 MMOL/L (ref 136–145)

## 2025-03-22 NOTE — NUR
SHIFT SUMMARY
PT AOX4, COOPERATIVE, ABLE TO MAKE NEEDS KNOWN. PT HAS REMAINED IN BED SINCE
TRANSFER TO [facility] FLOOR. USING URINAL APPROPRIATELY. COMPLAINED OF PAIN ONCE,
MEDICATED PER EMAR. FAMILY IS BEDSIDE. NO OTHER COMPLAINTS THIS SHIFT. BED IN
LOWEST POSITION, CALL LIGHT WITHIN REACH.

## 2025-03-22 NOTE — NUR
MORNING NOTE
THIS RN ASSUMED CARE AT APPROX 0715. PATIENT ALERT AND ORIENTED X3 - UNCLEAR
TO DATE/TIME, LOCATION. FORGETFUL - ASKS QUESTIONS TO GAIN UNDERSTANDING. FLAT
AFFECT. CLINICAL SITTER AT BEDSIDE TO ASSIST WITH CARE AND REDIRECTION. BED
ALARM/CHAIR ALARM IN USE. SBP 150s. MAP >65. DENIES CHEST PAIN, PRESSURE.
SCHEDULED PO HYDRALAZINE ADMINISTERED PER EMAR. ON ROOM AIR, SATs >90%. S/P L
ORIF - IMMOBILIZER IN PLACE. ACE WRAP C/D/I - NO SHADOWING NOTED. UP WITH
PHYSICAL THERAPY THIS MORNING - 2P ASSIST FWW GB. TOE TOUCH WEIGHT BEARING
STATUS. PAIN TOLERABLE AT THIS TIME. CURRENTLY SITTING UP IN CHAIR. BEDBATH
PERFORMED. ATTENDS IN PLACE FOR EPISODES OF URINARY INCONTINENCE - CHANGING
PRN TO KEEP C/D/I. CALL LIGHT IN REACH.
MD MILLS ROUNDING THIS MORNING - DISCUSSED RESTARTING ALL HOME PSYCH MEDS.
ORDER RECEIVED TO RESTART DAILY PO XYPREXA AND PO WELLBUTRIN AT HOME DOSES.
DOSAGE VERIFIED WITH WIFE AT BEDSIDE.

## 2025-03-22 NOTE — NUR
GLADIS SUMMARY
S/P LEFT FEMUR ORIF. BRACE AND ACE WRAP TO LLE IN PLACE, NO DRAINAGE NOTED.
NO C/O OF PAIN.  DRESSING TO CATH IN R CHEST CDI.  A/O TO SELF ONLY AT START
OF SHIFT AND VERY CONFUSED; NOW HAS INTERMITTENT EPISODES OF A/OX3. VITAL
SIGNS HAVE IMPROVED. IS VOIDING IN URINAL WITH ASSISTANCE AND PASSING FLATUS.
RAMONA PO INTAKE. PT ABLE TO REPOSITION SELF SOME IN BED IND. COMPULSIVE AND
IRRITABLE AT TIMES.  APPEARS TO HAVE RESTED COMFORTABLY FOR PERIODS T/O NIGHT.
SITTER PRESENT FOR ASSISTANCE AND SAFETY. PLAN TO WORK WITH THERAPY TODAY.
WILL GIVE REPORT TO ONCOMING RN.

## 2025-03-22 NOTE — NUR
PROVIDER UPDATED
HOSPITALIST NOTIFIED OF PT CONFUSION, VITALS SIGNS AND OVERALL STATUS.
NO NEW ORDERS OBTAINED.

## 2025-03-22 NOTE — NUR
TRANSFER
PT ARRIVED TO ROOM WITHOUT COMPLICATION. ON ROOM AIR, PT REPORTS BEING
"CONTINENT", BED SWAPPED. SKIN SEEMS TO BE UNCHANGED FROM PREVIOUS ASSESSMENT.
PT INTRODUCED TO ROOM. BED IN LOWEST POSITION, CALL LIGHT WITHIN REACH.

## 2025-03-23 VITALS — SYSTOLIC BLOOD PRESSURE: 122 MMHG | DIASTOLIC BLOOD PRESSURE: 59 MMHG

## 2025-03-23 VITALS — DIASTOLIC BLOOD PRESSURE: 64 MMHG | SYSTOLIC BLOOD PRESSURE: 127 MMHG

## 2025-03-23 VITALS — DIASTOLIC BLOOD PRESSURE: 60 MMHG | SYSTOLIC BLOOD PRESSURE: 135 MMHG

## 2025-03-23 VITALS — DIASTOLIC BLOOD PRESSURE: 73 MMHG | SYSTOLIC BLOOD PRESSURE: 141 MMHG

## 2025-03-23 LAB
ALBUMIN SERPL BCP-MCNC: 2.5 G/DL (ref 3.4–5)
ALBUMIN SERPL BCP-MCNC: 2.6 G/DL (ref 3.4–5)
ALBUMIN/GLOB SERPL: 0.6 {RATIO} (ref 0.8–1.8)
ALT SERPL W P-5'-P-CCNC: 71 U/L (ref 12–78)
ANION GAP SERPL CALCULATED.4IONS-SCNC: 8 MMOL/L (ref 3–11)
ANION GAP SERPL CALCULATED.4IONS-SCNC: 9 MMOL/L (ref 3–11)
AST SERPL W P-5'-P-CCNC: 85 U/L (ref 12–37)
BASOPHILS # BLD AUTO: 0.01 K/MM3 (ref 0–0.23)
BASOPHILS NFR BLD AUTO: 0 % (ref 0–2)
BILIRUB SERPL-MCNC: 0.5 MG/DL (ref 0.1–1)
BUN SERPL-MCNC: 43 MG/DL (ref 8–24)
BUN SERPL-MCNC: 45 MG/DL (ref 8–24)
CALCIUM SERPL-MCNC: 8.9 MG/DL (ref 8.5–10.1)
CALCIUM SERPL-MCNC: 9.1 MG/DL (ref 8.5–10.1)
CHLORIDE SERPL-SCNC: 106 MMOL/L (ref 98–108)
CHLORIDE SERPL-SCNC: 107 MMOL/L (ref 98–108)
CO2 SERPL-SCNC: 27 MMOL/L (ref 21–32)
CO2 SERPL-SCNC: 28 MMOL/L (ref 21–32)
CREAT SERPL-MCNC: 4.28 MG/DL (ref 0.6–1.2)
CREAT SERPL-MCNC: 4.31 MG/DL (ref 0.6–1.2)
DEPRECATED RDW RBC AUTO: 47.8 FL (ref 35.1–46.3)
EOSINOPHIL # BLD AUTO: 0.55 K/MM3 (ref 0–0.68)
EOSINOPHIL NFR BLD AUTO: 8 % (ref 0–6)
ERYTHROCYTE [DISTWIDTH] IN BLOOD BY AUTOMATED COUNT: 14.1 % (ref 11.7–14.2)
GLOBULIN SER CALC-MCNC: 4.1 G/DL (ref 2.2–4)
GLUCOSE SERPL-MCNC: 96 MG/DL (ref 70–99)
GLUCOSE SERPL-MCNC: 96 MG/DL (ref 70–99)
HCT VFR BLD AUTO: 24.4 % (ref 37–53)
HGB BLD-MCNC: 7.9 G/DL (ref 13.5–17.5)
IMM GRANULOCYTES # BLD AUTO: 0.03 K/MM3 (ref 0–0.1)
IMM GRANULOCYTES NFR BLD AUTO: 0 % (ref 0–1)
LYMPHOCYTES # BLD AUTO: 2.24 K/MM3 (ref 0.84–5.2)
LYMPHOCYTES NFR BLD AUTO: 33 % (ref 21–46)
MAGNESIUM SERPL-MCNC: 2.6 MG/DL (ref 1.6–2.4)
MCHC RBC AUTO-ENTMCNC: 32.4 G/DL (ref 31.5–36.5)
MCV RBC AUTO: 97 FL (ref 80–100)
MONOCYTES # BLD AUTO: 0.74 K/MM3 (ref 0.16–1.47)
MONOCYTES NFR BLD AUTO: 11 % (ref 4–13)
NEUTROPHILS # BLD AUTO: 3.32 K/MM3 (ref 1.96–9.15)
NEUTROPHILS NFR BLD AUTO: 48 % (ref 41–73)
NRBC # BLD AUTO: 0 K/MM3 (ref 0–0.02)
NRBC BLD AUTO-RTO: 0 /100 WBC (ref 0–0.2)
PHOSPHATE SERPL-MCNC: 4.4 MG/DL (ref 2.5–4.9)
PLATELET # BLD AUTO: 202 K/MM3 (ref 150–400)
POTASSIUM SERPL-SCNC: 3.9 MMOL/L (ref 3.5–5.5)
POTASSIUM SERPL-SCNC: 4 MMOL/L (ref 3.5–5.5)
PROT SERPL-MCNC: 6.7 G/DL (ref 6.4–8.2)
SODIUM SERPL-SCNC: 138 MMOL/L (ref 136–145)
SODIUM SERPL-SCNC: 139 MMOL/L (ref 136–145)

## 2025-03-23 NOTE — NUR
SHIFT SUMMARY NOC
 
PT A/O X 4. WITHDRAWN, BUT COOPERATIVE WITH CARE. VSS. NO ACUTE EVENTS TO
REPORT. PT POST OP DAY 6 L FEMORAL FX REPAIR, PAIN BEING MANAGED PER EMAR.
IMMOBILIZER DEVICE IN PLACE ON LLE OVER DRESSINGS.  PT GIVEN PRUNE JUICE WITH
EVENING RX TO HELP INTITIATE BM BEFORE GIVING MOM, DUE TO PT CONCERNS OF NOT
MAKING IT TO BSC IN TIME DUE TO LIMITED MOBILTIY. PT CURRENTLY RESTING WITH
BED IN LOWEST POSITION, AND CALL LIGHT WITHIN REACH.

## 2025-03-23 NOTE — NUR
SHIFT SUMMARY
PT A&OX4, VSS, DID NOT AMB THIS SHIFT, IS TOLERATING PO, VOIDING URINE, AND
PAIN MANAGED PER EMAR. IMMOBILIZER REMAINS IN PLACE. LAST DOSE OF IRON GIVEN.
NO OTHER ACUTE CHANGES. PLAN FOR D/C TO SNF, INSURANCE AUTH PENDING. CALL
LIGHT WITHIN REACH AND PT ABLE TO MAKE NEEDS KNOWN.

## 2025-03-24 VITALS — SYSTOLIC BLOOD PRESSURE: 111 MMHG | DIASTOLIC BLOOD PRESSURE: 64 MMHG

## 2025-03-24 VITALS — DIASTOLIC BLOOD PRESSURE: 64 MMHG | SYSTOLIC BLOOD PRESSURE: 99 MMHG

## 2025-03-24 VITALS — SYSTOLIC BLOOD PRESSURE: 109 MMHG | DIASTOLIC BLOOD PRESSURE: 66 MMHG

## 2025-03-24 VITALS — DIASTOLIC BLOOD PRESSURE: 65 MMHG | SYSTOLIC BLOOD PRESSURE: 134 MMHG

## 2025-03-24 VITALS — SYSTOLIC BLOOD PRESSURE: 115 MMHG | DIASTOLIC BLOOD PRESSURE: 66 MMHG

## 2025-03-24 VITALS — SYSTOLIC BLOOD PRESSURE: 95 MMHG | DIASTOLIC BLOOD PRESSURE: 59 MMHG

## 2025-03-24 VITALS — SYSTOLIC BLOOD PRESSURE: 114 MMHG | DIASTOLIC BLOOD PRESSURE: 65 MMHG

## 2025-03-24 VITALS — SYSTOLIC BLOOD PRESSURE: 140 MMHG | DIASTOLIC BLOOD PRESSURE: 69 MMHG

## 2025-03-24 VITALS — SYSTOLIC BLOOD PRESSURE: 106 MMHG | DIASTOLIC BLOOD PRESSURE: 65 MMHG

## 2025-03-24 VITALS — SYSTOLIC BLOOD PRESSURE: 130 MMHG | DIASTOLIC BLOOD PRESSURE: 69 MMHG

## 2025-03-24 VITALS — SYSTOLIC BLOOD PRESSURE: 101 MMHG | DIASTOLIC BLOOD PRESSURE: 64 MMHG

## 2025-03-24 VITALS — DIASTOLIC BLOOD PRESSURE: 64 MMHG | SYSTOLIC BLOOD PRESSURE: 120 MMHG

## 2025-03-24 VITALS — SYSTOLIC BLOOD PRESSURE: 91 MMHG | DIASTOLIC BLOOD PRESSURE: 57 MMHG

## 2025-03-24 VITALS — SYSTOLIC BLOOD PRESSURE: 118 MMHG | DIASTOLIC BLOOD PRESSURE: 62 MMHG

## 2025-03-24 VITALS — DIASTOLIC BLOOD PRESSURE: 68 MMHG | SYSTOLIC BLOOD PRESSURE: 130 MMHG

## 2025-03-24 VITALS — DIASTOLIC BLOOD PRESSURE: 71 MMHG | SYSTOLIC BLOOD PRESSURE: 134 MMHG

## 2025-03-24 VITALS — DIASTOLIC BLOOD PRESSURE: 58 MMHG | SYSTOLIC BLOOD PRESSURE: 103 MMHG

## 2025-03-24 VITALS — DIASTOLIC BLOOD PRESSURE: 56 MMHG | SYSTOLIC BLOOD PRESSURE: 106 MMHG

## 2025-03-24 VITALS — DIASTOLIC BLOOD PRESSURE: 66 MMHG | SYSTOLIC BLOOD PRESSURE: 120 MMHG

## 2025-03-24 LAB
ALBUMIN SERPL BCP-MCNC: 2.5 G/DL (ref 3.4–5)
ANION GAP SERPL CALCULATED.4IONS-SCNC: 10 MMOL/L (ref 3–11)
BASOPHILS # BLD AUTO: 0 K/MM3 (ref 0–0.23)
BASOPHILS NFR BLD AUTO: 0 % (ref 0–2)
BUN SERPL-MCNC: 49 MG/DL (ref 8–24)
CALCIUM SERPL-MCNC: 9 MG/DL (ref 8.5–10.1)
CHLORIDE SERPL-SCNC: 106 MMOL/L (ref 98–108)
CO2 SERPL-SCNC: 25 MMOL/L (ref 21–32)
CREAT SERPL-MCNC: 4.56 MG/DL (ref 0.6–1.2)
DEPRECATED RDW RBC AUTO: 48.8 FL (ref 35.1–46.3)
EOSINOPHIL # BLD AUTO: 0.48 K/MM3 (ref 0–0.68)
EOSINOPHIL NFR BLD AUTO: 7 % (ref 0–6)
ERYTHROCYTE [DISTWIDTH] IN BLOOD BY AUTOMATED COUNT: 14.3 % (ref 11.7–14.2)
GLUCOSE SERPL-MCNC: 92 MG/DL (ref 70–99)
HCT VFR BLD AUTO: 23.5 % (ref 37–53)
HGB BLD-MCNC: 7.8 G/DL (ref 13.5–17.5)
IMM GRANULOCYTES # BLD AUTO: 0.03 K/MM3 (ref 0–0.1)
IMM GRANULOCYTES NFR BLD AUTO: 0 % (ref 0–1)
LYMPHOCYTES # BLD AUTO: 2.03 K/MM3 (ref 0.84–5.2)
LYMPHOCYTES NFR BLD AUTO: 29 % (ref 21–46)
MCHC RBC AUTO-ENTMCNC: 33.2 G/DL (ref 31.5–36.5)
MCV RBC AUTO: 98 FL (ref 80–100)
MONOCYTES # BLD AUTO: 0.84 K/MM3 (ref 0.16–1.47)
MONOCYTES NFR BLD AUTO: 12 % (ref 4–13)
NEUTROPHILS # BLD AUTO: 3.69 K/MM3 (ref 1.96–9.15)
NEUTROPHILS NFR BLD AUTO: 52 % (ref 41–73)
NRBC # BLD AUTO: 0 K/MM3 (ref 0–0.02)
NRBC BLD AUTO-RTO: 0 /100 WBC (ref 0–0.2)
PHOSPHATE SERPL-MCNC: 4.5 MG/DL (ref 2.5–4.9)
PLATELET # BLD AUTO: 217 K/MM3 (ref 150–400)
POTASSIUM SERPL-SCNC: 4 MMOL/L (ref 3.5–5.5)
SODIUM SERPL-SCNC: 137 MMOL/L (ref 136–145)

## 2025-03-24 NOTE — NUR
PT HAD DIALYSIS TODAY AROUND 0830 AND HAS BEEN LETHARGIC SINCE. PT HAD NO C/O
PAIN, SOB OR CHEST PAIN

## 2025-03-24 NOTE — NUR
SHIFT SUMMARY NOC
 
PT A/O X 4. WITHDRAWN, BUT PLEASANT AND COOOPERATIVE WITH CARE. VSS.  POST OP
DAY 7 L FEMUR ORIF, HAS IMMOBILIZER IN PLACE. PAIN BEING MANAGED PER EMAR. DR STALLWORTH SAW PT AT BEGINNING OF SHIFT AND TOLD PT THAT THEY WILL HAVE DIALYSIS
TODAY. PT HAS PERM CATH IN [facility]. PT RECEIVED LAST DOSE OF IRON REPLACEMENT
YESTERAY. PT CURRENTLY RESTING WITH BED IN LOWEST POSITION, AND CALL LIGHT
WITHIN REACH.

## 2025-03-25 VITALS — SYSTOLIC BLOOD PRESSURE: 141 MMHG | DIASTOLIC BLOOD PRESSURE: 73 MMHG

## 2025-03-25 VITALS — SYSTOLIC BLOOD PRESSURE: 137 MMHG | DIASTOLIC BLOOD PRESSURE: 67 MMHG

## 2025-03-25 LAB
ALBUMIN SERPL BCP-MCNC: 2.7 G/DL (ref 3.4–5)
ANION GAP SERPL CALCULATED.4IONS-SCNC: 8 MMOL/L (ref 3–11)
BUN SERPL-MCNC: 33 MG/DL (ref 8–24)
CALCIUM SERPL-MCNC: 8.7 MG/DL (ref 8.5–10.1)
CHLORIDE SERPL-SCNC: 99 MMOL/L (ref 98–108)
CO2 SERPL-SCNC: 32 MMOL/L (ref 21–32)
CREAT SERPL-MCNC: 3.48 MG/DL (ref 0.6–1.2)
GLUCOSE SERPL-MCNC: 95 MG/DL (ref 70–99)
PHOSPHATE SERPL-MCNC: 2.6 MG/DL (ref 2.5–4.9)
POTASSIUM SERPL-SCNC: 3.7 MMOL/L (ref 3.5–5.5)
SODIUM SERPL-SCNC: 135 MMOL/L (ref 136–145)

## 2025-03-25 NOTE — NUR
[facility] ALL PIV'S REMOVED. D/C
PACKET IN HAND

## 2025-03-25 NOTE — NUR
SHIFT SUMMARY; PATIENT SLEPT IN LONG INTERVALS. MEDICATED SEVERAL TIMES FOR
PAIN. NO BM'S THIS SHIFT.